# Patient Record
Sex: FEMALE | Race: WHITE | Employment: FULL TIME | URBAN - METROPOLITAN AREA
[De-identification: names, ages, dates, MRNs, and addresses within clinical notes are randomized per-mention and may not be internally consistent; named-entity substitution may affect disease eponyms.]

---

## 2018-09-20 ENCOUNTER — HOSPITAL ENCOUNTER (EMERGENCY)
Age: 28
Discharge: HOME | End: 2018-09-20
Attending: EMERGENCY MEDICINE
Payer: COMMERCIAL

## 2018-09-20 VITALS
HEART RATE: 105 BPM | TEMPERATURE: 98.9 F | DIASTOLIC BLOOD PRESSURE: 83 MMHG | HEIGHT: 65 IN | BODY MASS INDEX: 22.82 KG/M2 | OXYGEN SATURATION: 100 % | RESPIRATION RATE: 15 BRPM | WEIGHT: 137 LBS | SYSTOLIC BLOOD PRESSURE: 125 MMHG

## 2018-09-20 DIAGNOSIS — E86.0 DEHYDRATION: ICD-10-CM

## 2018-09-20 DIAGNOSIS — R11.10 VOMITING, INTRACTABILITY OF VOMITING NOT SPECIFIED, PRESENCE OF NAUSEA NOT SPECIFIED, UNSPECIFIED VOMITING TYPE: Primary | ICD-10-CM

## 2018-09-20 LAB
ALBUMIN SERPL-MCNC: 3.4 G/DL (ref 3.5–5)
ALBUMIN/GLOB SERPL: 0.9 {RATIO} (ref 1.1–2.2)
ALP SERPL-CCNC: 44 U/L (ref 45–117)
ALT SERPL-CCNC: 14 U/L (ref 12–78)
ANION GAP SERPL CALC-SCNC: 11 MMOL/L (ref 5–15)
AST SERPL-CCNC: 21 U/L (ref 15–37)
BASOPHILS # BLD: 0 K/UL (ref 0–0.1)
BASOPHILS NFR BLD: 0 % (ref 0–1)
BILIRUB SERPL-MCNC: 0.4 MG/DL (ref 0.2–1)
BUN SERPL-MCNC: 17 MG/DL (ref 6–20)
BUN/CREAT SERPL: 17 (ref 12–20)
CALCIUM SERPL-MCNC: 8.2 MG/DL (ref 8.5–10.1)
CHLORIDE SERPL-SCNC: 112 MMOL/L (ref 97–108)
CO2 SERPL-SCNC: 18 MMOL/L (ref 21–32)
COMMENT, HOLDF: NORMAL
CREAT SERPL-MCNC: 1.03 MG/DL (ref 0.55–1.02)
DIFFERENTIAL METHOD BLD: ABNORMAL
EOSINOPHIL # BLD: 0 K/UL (ref 0–0.4)
EOSINOPHIL NFR BLD: 0 % (ref 0–7)
ERYTHROCYTE [DISTWIDTH] IN BLOOD BY AUTOMATED COUNT: 11.1 % (ref 11.5–14.5)
GLOBULIN SER CALC-MCNC: 3.9 G/DL (ref 2–4)
GLUCOSE SERPL-MCNC: 104 MG/DL (ref 65–100)
HCT VFR BLD AUTO: 43.7 % (ref 35–47)
HGB BLD-MCNC: 14.4 G/DL (ref 11.5–16)
IMM GRANULOCYTES # BLD: 0.1 K/UL (ref 0–0.04)
IMM GRANULOCYTES NFR BLD AUTO: 1 % (ref 0–0.5)
LIPASE SERPL-CCNC: 213 U/L (ref 73–393)
LYMPHOCYTES # BLD: 0.8 K/UL (ref 0.8–3.5)
LYMPHOCYTES NFR BLD: 7 % (ref 12–49)
MCH RBC QN AUTO: 30.6 PG (ref 26–34)
MCHC RBC AUTO-ENTMCNC: 33 G/DL (ref 30–36.5)
MCV RBC AUTO: 92.8 FL (ref 80–99)
MONOCYTES # BLD: 0.5 K/UL (ref 0–1)
MONOCYTES NFR BLD: 4 % (ref 5–13)
NEUTS SEG # BLD: 10.8 K/UL (ref 1.8–8)
NEUTS SEG NFR BLD: 89 % (ref 32–75)
NRBC # BLD: 0 K/UL (ref 0–0.01)
NRBC BLD-RTO: 0 PER 100 WBC
PLATELET # BLD AUTO: 252 K/UL (ref 150–400)
PMV BLD AUTO: 10.3 FL (ref 8.9–12.9)
POTASSIUM SERPL-SCNC: 3.9 MMOL/L (ref 3.5–5.1)
PROT SERPL-MCNC: 7.3 G/DL (ref 6.4–8.2)
RBC # BLD AUTO: 4.71 M/UL (ref 3.8–5.2)
SAMPLES BEING HELD,HOLD: NORMAL
SODIUM SERPL-SCNC: 141 MMOL/L (ref 136–145)
WBC # BLD AUTO: 12.2 K/UL (ref 3.6–11)

## 2018-09-20 PROCEDURE — 96361 HYDRATE IV INFUSION ADD-ON: CPT

## 2018-09-20 PROCEDURE — 74011250636 HC RX REV CODE- 250/636: Performed by: EMERGENCY MEDICINE

## 2018-09-20 PROCEDURE — 83690 ASSAY OF LIPASE: CPT | Performed by: EMERGENCY MEDICINE

## 2018-09-20 PROCEDURE — 85025 COMPLETE CBC W/AUTO DIFF WBC: CPT | Performed by: EMERGENCY MEDICINE

## 2018-09-20 PROCEDURE — 99284 EMERGENCY DEPT VISIT MOD MDM: CPT

## 2018-09-20 PROCEDURE — 36415 COLL VENOUS BLD VENIPUNCTURE: CPT | Performed by: EMERGENCY MEDICINE

## 2018-09-20 PROCEDURE — 96374 THER/PROPH/DIAG INJ IV PUSH: CPT

## 2018-09-20 PROCEDURE — 80053 COMPREHEN METABOLIC PANEL: CPT | Performed by: EMERGENCY MEDICINE

## 2018-09-20 RX ORDER — ONDANSETRON 2 MG/ML
4 INJECTION INTRAMUSCULAR; INTRAVENOUS ONCE
Status: COMPLETED | OUTPATIENT
Start: 2018-09-20 | End: 2018-09-20

## 2018-09-20 RX ORDER — ONDANSETRON 4 MG/1
4 TABLET, FILM COATED ORAL
Qty: 12 TAB | Refills: 0 | Status: SHIPPED | OUTPATIENT
Start: 2018-09-20

## 2018-09-20 RX ADMIN — ONDANSETRON 4 MG: 2 INJECTION INTRAMUSCULAR; INTRAVENOUS at 17:13

## 2018-09-20 RX ADMIN — SODIUM CHLORIDE 1000 ML: 900 INJECTION, SOLUTION INTRAVENOUS at 17:13

## 2018-09-20 RX ADMIN — SODIUM CHLORIDE 1000 ML: 900 INJECTION, SOLUTION INTRAVENOUS at 19:06

## 2018-09-20 NOTE — ED TRIAGE NOTES
Pt scratched her cornea 2 weeks ago, it got infected and had a cornea replacement yesterday. Today states that she is taking a pill \"to release pressure in eye\" and states that she feels dehydrated and weak with N/V. Her MD told her to come to the ED.

## 2018-09-20 NOTE — ED PROVIDER NOTES
HPI Comments: 33 y.o. female with no significant past medical history who presents via EMS with chief complaint of post-op problem. Patient states \"two weeks ago\" she scratched her cornea which got infected, and underwent a cornea transplant yesterday which was performed by Dr. Governor Nishi MD. Patient was seen by Dr. Danya Martínez prior to arrival for follow up, had her eye evaluated which was normal and stated the [facility] is healing great\", and was given Diamox to \"release eye pressure. \" Patient reports immediately after taking the Diamox at 1200 onset of nausea and vomiting, and was referred to [facility] ED by Dr. Shayy Chang for treatment of dehydration. Patient presents to [facility] ED with continuation of nausea and vomiting and states \"feeling like she is dehydrated\", but denies any problems with her eye. Patient reports accompanying generalized weakness and abdominal pain. Of note, patient states she was under general anesthesia \"for the first time\" during the surgery yesterday. However, patient specifically denies having nausea, vomiting, or generalized weakness yesterday, and that symptoms onset today after taking Diamox. Pt denies fever, chills, cough, congestion, shortness of breath, chest pain, diarrhea, difficulty with urination or dysuria. There are no other acute medical concerns at this time. Note written by Mynor Aranda, as dictated by Jw Michele MD 5:05 PM 
 
 
 
The history is provided by the patient. History reviewed. No pertinent past medical history. Past Surgical History:  
Procedure Laterality Date  HX HEENT    
 cornea repair History reviewed. No pertinent family history. Social History Social History  Marital status: N/A Spouse name: N/A  
 Number of children: N/A  
 Years of education: N/A Occupational History  Not on file. Social History Main Topics  Smoking status: Never Smoker  Smokeless tobacco: Never Used  Alcohol use Yes  Drug use: No  
 Sexual activity: Not on file Other Topics Concern  Not on file Social History Narrative  No narrative on file ALLERGIES: Pcn [penicillins] Review of Systems Constitutional: Negative for appetite change, chills and fever. HENT: Negative for congestion. Eyes: Negative for pain. Respiratory: Negative for cough and shortness of breath. Cardiovascular: Negative for chest pain. Gastrointestinal: Positive for abdominal pain, nausea and vomiting. Negative for diarrhea. Genitourinary: Negative for difficulty urinating and dysuria. Neurological: Positive for weakness. All other systems reviewed and are negative. Vitals:  
 09/20/18 1649 BP: 103/70 Pulse: (!) 112 Resp: 17 Temp: 98.1 °F (36.7 °C) SpO2: 99% Weight: 62.1 kg (137 lb) Height: 5' 5\" (1.651 m) Physical Exam  
Constitutional: She is oriented to person, place, and time. She appears well-developed and well-nourished. No distress. Pale and weak appearing HENT:  
Head: Normocephalic and atraumatic. Mucous membranes slightly dry Eyes: Eyes not evaluated due to normal evaluation by Dr. German Timmons prior to arrival  
Neck: Neck supple. No tracheal deviation present. Cardiovascular: Normal rate, regular rhythm, normal heart sounds and intact distal pulses. Exam reveals no gallop and no friction rub. No murmur heard. Pulmonary/Chest: Effort normal and breath sounds normal. She has no wheezes. She has no rales. Abdominal: Soft. She exhibits no distension. There is no tenderness. There is no rebound and no guarding. Musculoskeletal: She exhibits no edema. Neurological: She is alert and oriented to person, place, and time. No focal neurological deficits Skin: Skin is warm and dry. No rash noted. Psychiatric: She has a normal mood and affect. Nursing note and vitals reviewed.  
Note written by Mynor Siu, as dictated by Shukri Estes MD 5:06 PM 
  
 MDM 
Number of Diagnoses or Management Options Dehydration:  
Vomiting, intractability of vomiting not specified, presence of nausea not specified, unspecified vomiting type:  
  
Amount and/or Complexity of Data Reviewed Clinical lab tests: reviewed and ordered Tests in the radiology section of CPT®: ordered and reviewed Tests in the medicine section of CPT®: ordered and reviewed Discussion of test results with the performing providers: yes Obtain history from someone other than the patient: yes Discuss the patient with other providers: yes ED Course Procedures Assessment and plan   CTA essentially negative will admit for workup of possible CVA I spoke with the hospitalist who agrees to accept her for further workup